# Patient Record
Sex: MALE | Race: WHITE | Employment: FULL TIME | URBAN - METROPOLITAN AREA
[De-identification: names, ages, dates, MRNs, and addresses within clinical notes are randomized per-mention and may not be internally consistent; named-entity substitution may affect disease eponyms.]

---

## 2018-06-04 ENCOUNTER — OFFICE VISIT (OUTPATIENT)
Dept: OPHTHALMOLOGY | Facility: CLINIC | Age: 38
End: 2018-06-04
Payer: COMMERCIAL

## 2018-06-04 DIAGNOSIS — H10.13 ALLERGIC CONJUNCTIVITIS OF BOTH EYES: Primary | ICD-10-CM

## 2018-06-04 RX ORDER — OLOPATADINE HYDROCHLORIDE 2 MG/ML
1 SOLUTION/ DROPS OPHTHALMIC DAILY
Qty: 1 BOTTLE | Refills: 3 | Status: SHIPPED | OUTPATIENT
Start: 2018-06-04 | End: 2019-06-27

## 2018-06-04 RX ORDER — CETIRIZINE HYDROCHLORIDE 10 MG/1
10 TABLET ORAL DAILY
COMMUNITY
End: 2019-07-26

## 2018-06-04 ASSESSMENT — EXTERNAL EXAM - RIGHT EYE: OD_EXAM: NORMAL

## 2018-06-04 ASSESSMENT — CONF VISUAL FIELD
OD_NORMAL: 1
METHOD: COUNTING FINGERS
OS_NORMAL: 1

## 2018-06-04 ASSESSMENT — CUP TO DISC RATIO
OS_RATIO: 0.3
OD_RATIO: 0.35

## 2018-06-04 ASSESSMENT — TONOMETRY
OD_IOP_MMHG: 18
OS_IOP_MMHG: 19
IOP_METHOD: ICARE

## 2018-06-04 ASSESSMENT — VISUAL ACUITY
OS_SC: 20/15
METHOD: SNELLEN - LINEAR
OD_SC: 20/20

## 2018-06-04 ASSESSMENT — SLIT LAMP EXAM - LIDS
COMMENTS: NORMAL
COMMENTS: NORMAL

## 2018-06-04 ASSESSMENT — EXTERNAL EXAM - LEFT EYE: OS_EXAM: NORMAL

## 2018-06-04 NOTE — PROGRESS NOTES
Assessment/Plan  (H10.13) Allergic conjunctivitis of both eyes  (primary encounter diagnosis)  Comment: Symptomatic, relief with ClearEyes  Plan: olopatadine HCl (PATADAY) 0.2 % SOLN         Educated patient on clinical findings. Prescribed Pataday qd OU, and recommended Systane Ultra prn. Advised against the use of ClearEyes and Visine, as these products contain astringents.     Return to clinic in 6 weeks for follow-up and dilated exam.    Complete documentation of historical and exam elements from today's encounter can  be found in the full encounter summary report (not reduplicated in this progress  note). I personally obtained the chief complaint(s) and history of present illness. I  confirmed and edited as necessary the review of systems, past medical/surgical  history, family history, social history, and examination findings as documented by  others; and I examined the patient myself. I personally reviewed the relevant tests,  images, and reports as documented above. I formulated and edited as necessary the  assessment and plan and discussed the findings and management plan with the  patient and family.    Kory Bautista, ANGEL, FAAO

## 2018-06-04 NOTE — MR AVS SNAPSHOT
After Visit Summary   6/4/2018    Delbert Craig    MRN: [id number]           Patient Information     Date Of Birth          1980        Visit Information        Provider Department      6/4/2018 8:30 AM Kory Bautista, OD [facility]        Today's Diagnoses     Allergic conjunctivitis of both eyes    -  1       Follow-ups after your visit        Follow-up notes from your care team     Return in about 6 weeks (around 7/16/2018) for Follow Up.      Your next 10 appointments already scheduled     Jul 16, 2018  9:00 AM CDT   Return Visit with Kory Bautista, ANGEL   [facility] ([facility])    [facility]  [address] [address] [phone]              Who to contact     Please call your clinic at [phone] to:    Ask questions about your health    Make or cancel appointments    Discuss your medicines    Learn about your test results    Speak to your doctor            Additional Information About Your Visit        MyChart Information     [employer] gives you secure access to your electronic health record. If you see a primary care provider, you can also send messages to your care team and make appointments. If you have questions, please call your primary care clinic.  If you do not have a primary care provider, please call [phone] and they will assist you.      [employer] is an electronic gateway that provides easy, online access to your medical records. With [employer], you can request a clinic appointment, read your test results, renew a prescription or communicate with your care team.     To access your existing account, please contact your [facility] or call [phone] for assistance.        Care EveryWhere ID     This is your Care EveryWhere ID. This could be used by other organizations to access your [place] medical records  [id number]          Blood Pressure from Last 3 Encounters:   03/30/16 110/60   02/09/16 121/76   08/14/15 116/74    Weight from Last 3 Encounters:   03/30/16 85 kg (187 lb 8 oz)   02/09/16 86.5 kg (190 lb 12.8 oz)   08/14/15 85.6 kg (188 lb 12.8 oz)              Today, you had the following     No orders found for display         Today's Medication Changes          These changes are accurate as of 6/4/18  9:05 AM.  If you have any questions, ask your nurse or doctor.               Start taking these medicines.        Dose/Directions    olopatadine HCl 0.2 % Soln   Commonly known as:  PATADAY   Used for:  Allergic conjunctivitis of both eyes   Started by:  Kory Bautista, OD        Dose:  1 drop   Place 1 drop into both eyes daily   Quantity:  1 Bottle   Refills:  3            Where to get your medicines      These medications were sent to [place] [address] [place] [address]     Phone:  [phone]     olopatadine HCl 0.2 % Soln                Primary Care Provider Office Phone # Fax #    Tex Vivar [phone] [address] [address]        Equal Access to Services     ANGELA MURPHY AH: Newton brancho Sobrunildaali, waaxda luqadaha, qaybta kaalmada adeegyada, josiah batista haygabriel esqueda. So [facility] [phone].    ATENCIÓN: Si habla español, tiene a landeros disposición servicios gratuitos de asistencia lingüística. Llame al [phone].    We comply with applicable federal civil rights laws and [place] laws. We do not discriminate on the basis of race, color, national origin, age, disability, sex, sexual orientation, or gender identity.            Thank you!     Thank you for choosing [facility]  for your care. Our goal is always to provide you with excellent care. Hearing back from our patients is one way we can continue to improve our services. Please take a few minutes to  complete the written survey that you may receive in the mail after your visit with us. Thank you!             Your Updated Medication List - Protect others around you: Learn how to safely use, store and throw away your medicines at www.disposemymeds.org.          This list is accurate as of 6/4/18  9:05 AM.  Always use your most recent med list.                   Brand Name Dispense Instructions for use Diagnosis    cetirizine 10 MG tablet    zyrTEC     Take 10 mg by mouth daily        naphazoline-pheniramine Soln    NAPHCON-A, VISINE-A     Place 1 drop into both eyes daily        olopatadine HCl 0.2 % Soln    PATADAY    1 Bottle    Place 1 drop into both eyes daily    Allergic conjunctivitis of both eyes       sertraline 50 MG tablet    ZOLOFT    90 tablet    Take 1 tablet (50 mg) by mouth daily    Dysthymia

## 2018-06-04 NOTE — NURSING NOTE
"Chief Complaints and History of Present Illnesses   Patient presents with     Red Eye Both Eyes     HPI    Affected eye(s):  Both   Symptoms:     No decreased vision   Redness   Itching      Duration:  6 months   Frequency:  Constant       Do you have eye pain now?:  No      Comments:  Pt states that he has always had red eye issues that never really went away, in spring he would also get really itchy He had been using Clear Eyes Complete Symptom relief with (with naphazoline) for \"years\" for redness    This year he tried a bunch of different allergy medications.  He is using Visine allergy drops this season (also have naphazoline) and it also helped with the itching.  He is wondering if there are any problems/long term effects of using this medication on a daily basis long term.    Pt has never worn glasses/contacts.  He has \"one good eye and one bad eye\" from childhood - he thinks one is 20/20 and one is 20/30.  They work together well and he is happy with level of vision.      Marcia Valadez, BALJINDER 8:38 AM 06/04/2018               "

## 2019-05-03 ENCOUNTER — OFFICE VISIT (OUTPATIENT)
Dept: FAMILY MEDICINE | Facility: CLINIC | Age: 39
End: 2019-05-03
Payer: COMMERCIAL

## 2019-05-03 VITALS
HEIGHT: 77 IN | TEMPERATURE: 98.1 F | SYSTOLIC BLOOD PRESSURE: 128 MMHG | OXYGEN SATURATION: 98 % | WEIGHT: 193 LBS | DIASTOLIC BLOOD PRESSURE: 67 MMHG | HEART RATE: 77 BPM | BODY MASS INDEX: 22.79 KG/M2

## 2019-05-03 DIAGNOSIS — B35.1 ONYCHOMYCOSIS: ICD-10-CM

## 2019-05-03 PROCEDURE — 99214 OFFICE O/P EST MOD 30 MIN: CPT | Performed by: FAMILY MEDICINE

## 2019-05-03 RX ORDER — TERBINAFINE HYDROCHLORIDE 250 MG/1
250 TABLET ORAL DAILY
Qty: 90 TABLET | Refills: 0 | Status: SHIPPED | OUTPATIENT
Start: 2019-05-03 | End: 2019-06-27

## 2019-05-03 ASSESSMENT — MIFFLIN-ST. JEOR: SCORE: 1912.82

## 2019-05-03 NOTE — NURSING NOTE
"Chief Complaint   Patient presents with     Toenail     right great toenail fungus     initial /67 (BP Location: Right arm, Cuff Size: Adult Regular)   Pulse 77   Temp 98.1  F (36.7  C) (Oral)   Ht 1.956 m (6' 5\")   Wt 87.5 kg (193 lb)   SpO2 98%   BMI 22.89 kg/m   Estimated body mass index is 22.89 kg/m  as calculated from the following:    Height as of this encounter: 1.956 m (6' 5\").    Weight as of this encounter: 87.5 kg (193 lb).  BP completed using cuff size: regular.  R arm      Health Maintenance that is potentially due pending provider review:  NONE    n/a    Americo Mendiola ma  "

## 2019-05-03 NOTE — PROGRESS NOTES
"  SUBJECTIVE:   Delbert Craig is a 38 year old male who presents to clinic today for the following   health issues:      Patient has toenail fungus on his great toe  He has had this problem before and believes he had a treatment with terbinafine which was effective    He is worried about the side effects of it but would like to consider doing it again as the problem is recurring    ROS: As per HPI.  Constitutional: no recent illness, no fevers/sweats/chills  No other rash    Allergies, reviewed:     Allergies   Allergen Reactions     No Known Drug Allergy       Med list prior to vist:    Current Outpatient Medications on File Prior to Visit:  cetirizine (ZYRTEC) 10 MG tablet Take 10 mg by mouth daily   naphazoline-pheniramine (NAPHCON-A, VISINE-A) SOLN Place 1 drop into both eyes daily   olopatadine HCl (PATADAY) 0.2 % SOLN Place 1 drop into both eyes daily (Patient not taking: Reported on 5/3/2019)   sertraline (ZOLOFT) 50 MG tablet Take 1 tablet (50 mg) by mouth daily (Patient not taking: Reported on 5/3/2019)     No current facility-administered medications on file prior to visit.   Medications reviewed and updated    Objective:  /67 (BP Location: Right arm, Cuff Size: Adult Regular)   Pulse 77   Temp 98.1  F (36.7  C) (Oral)   Ht 1.956 m (6' 5\")   Wt 87.5 kg (193 lb)   SpO2 98%   BMI 22.89 kg/m    General Appearance: Pleasant, alert, WN/WD in no acute respiratory distress.  Eye Exam: Normal external eye, conjunctiva, lids.  OroPharynx Exam: Dental hygiene adequate. Normal buccal mucosa. Normal pharynx.  Neck Exam: Supple, no masses or enlarged, tender nodes.  Cardiovascular Exam: No edema or vascular insufficiency.  Skin: No other rash mild great toe onychomycosis  Neurologic Exam: Nonfocal, no tremor. Normal gait.  Psychiatric Exam: Alert - appropriate, normal affect    ASSESSMENT/PLAN:    ICD-10-CM    1. Onychomycosis B35.1 terbinafine (LAMISIL) 250 MG tablet     Comprehensive metabolic panel "   We discussed the pluses and minuses of using medications such as these for treatment of this condition  We had a discussion about this diagnosis and went over the various aspects to consider. Any treatment is optional. Terbinafine (Lamisil) is effective,  may have liver toxicity, recommend alcohol moderation, and rechecking liver function tests if it's being taken for a prolonged period of time    . After discussion the patient has decided to accept an Rx for Lamisil.     Additional follow-up includes a recheck of liver function tests in 4 weeks    Michael Eubanks MD MPH

## 2019-06-27 ENCOUNTER — OFFICE VISIT (OUTPATIENT)
Dept: FAMILY MEDICINE | Facility: CLINIC | Age: 39
End: 2019-06-27
Payer: COMMERCIAL

## 2019-06-27 VITALS
TEMPERATURE: 98.1 F | SYSTOLIC BLOOD PRESSURE: 104 MMHG | BODY MASS INDEX: 22.62 KG/M2 | HEART RATE: 72 BPM | HEIGHT: 77 IN | WEIGHT: 191.6 LBS | OXYGEN SATURATION: 97 % | DIASTOLIC BLOOD PRESSURE: 66 MMHG

## 2019-06-27 DIAGNOSIS — N52.9 ERECTILE DYSFUNCTION, UNSPECIFIED ERECTILE DYSFUNCTION TYPE: ICD-10-CM

## 2019-06-27 DIAGNOSIS — B35.1 ONYCHOMYCOSIS: Primary | ICD-10-CM

## 2019-06-27 PROCEDURE — 80076 HEPATIC FUNCTION PANEL: CPT | Performed by: PHYSICIAN ASSISTANT

## 2019-06-27 PROCEDURE — 99214 OFFICE O/P EST MOD 30 MIN: CPT | Performed by: PHYSICIAN ASSISTANT

## 2019-06-27 PROCEDURE — 36415 COLL VENOUS BLD VENIPUNCTURE: CPT | Performed by: PHYSICIAN ASSISTANT

## 2019-06-27 RX ORDER — SILDENAFIL CITRATE 20 MG/1
TABLET ORAL
Qty: 30 TABLET | Refills: 3 | Status: SHIPPED | OUTPATIENT
Start: 2019-06-27 | End: 2019-10-07

## 2019-06-27 RX ORDER — TERBINAFINE HYDROCHLORIDE 250 MG/1
250 TABLET ORAL DAILY
Qty: 90 TABLET | Refills: 0 | Status: SHIPPED | OUTPATIENT
Start: 2019-06-27 | End: 2020-01-13

## 2019-06-27 ASSESSMENT — MIFFLIN-ST. JEOR: SCORE: 1906.47

## 2019-06-27 NOTE — NURSING NOTE
"Chief Complaint   Patient presents with     Recheck Medication     terbinafine     /66   Pulse 72   Temp 98.1  F (36.7  C) (Oral)   Ht 1.956 m (6' 5\")   Wt 86.9 kg (191 lb 9.6 oz)   SpO2 97%   BMI 22.72 kg/m   Estimated body mass index is 22.72 kg/m  as calculated from the following:    Height as of this encounter: 1.956 m (6' 5\").    Weight as of this encounter: 86.9 kg (191 lb 9.6 oz).        Health Maintenance due pending provider review:  NONE    n/a    Lissa Blackburn CMA  "

## 2019-06-27 NOTE — PROGRESS NOTES
Subjective     Delbert rCaig is a 38 year old male who presents to clinic today for the following health issues:    HPI   Chief Complaint   Patient presents with     Recheck Medication     terbinafine       Has been on terbinafine for about 2 months.  He does think that it is helping.  He does remember zora on in the past, and thinks that he may need longer than 3 months to fully go away.  Tolerating well.  Due for hepatic panel today.    He is also interested in medication for ED.  He does admit that this is something that he has struggled with on and off with for some time.  He has taken a viagra in the past, and has work well without side effects.  He has trouble maintaining erections.    BP Readings from Last 3 Encounters:   06/27/19 104/66   05/03/19 128/67   03/30/16 110/60    Wt Readings from Last 3 Encounters:   06/27/19 86.9 kg (191 lb 9.6 oz)   05/03/19 87.5 kg (193 lb)   03/30/16 85 kg (187 lb 8 oz)                      Reviewed and updated as needed this visit by Provider         Review of Systems   ROS COMP: Constitutional, HEENT, cardiovascular, pulmonary, gi and gu systems are negative, except as otherwise noted.      Objective    Wt 86.9 kg (191 lb 9.6 oz)   BMI 22.72 kg/m    Body mass index is 22.72 kg/m .  Physical Exam   GENERAL: alert and no distress  EYES: Eyes grossly normal to inspection  RESP: lungs clear to auscultation - no rales, rhonchi or wheezes  CV: regular rate and rhythm, normal S1 S2, no S3 or S4, no murmur, click or rub, no peripheral edema and peripheral pulses strong  PSYCH: mentation appears normal, affect normal/bright    Diagnostic Test Results:  Labs reviewed in Epic        Assessment & Plan     1. Onychomycosis  Refilled and will check hepatic panel.  Tolerating well.  - terbinafine (LAMISIL) 250 MG tablet; Take 1 tablet (250 mg) by mouth daily  Dispense: 90 tablet; Refill: 0  - Hepatic panel    2. Erectile dysfunction, unspecified erectile dysfunction type  Discussed  benefits and potential side effects along with other choices such as cialis.  He would like to try sildenafil.    - sildenafil (REVATIO) 20 MG tablet; 20-40 mg PO 30 minutes prior to sexual activity.  Dispense: 30 tablet; Refill: 3           No follow-ups on file.    Sebastien Tena PA-C  [facility]

## 2019-06-28 LAB
ALBUMIN SERPL-MCNC: 4.5 G/DL (ref 3.4–5)
ALP SERPL-CCNC: 62 U/L (ref 40–150)
ALT SERPL W P-5'-P-CCNC: 28 U/L (ref 0–70)
AST SERPL W P-5'-P-CCNC: 17 U/L (ref 0–45)
BILIRUB DIRECT SERPL-MCNC: 0.2 MG/DL (ref 0–0.2)
BILIRUB SERPL-MCNC: 0.4 MG/DL (ref 0.2–1.3)
PROT SERPL-MCNC: 7 G/DL (ref 6.8–8.8)

## 2019-07-01 NOTE — RESULT ENCOUNTER NOTE
Dear Delbert    Your test results are attached, feel free to contact me via [email or web address]     Everything looks just fine on your liver tests.    Mir Tena PA-C

## 2019-07-25 NOTE — TELEPHONE ENCOUNTER
RECORDS RECEIVED FROM: Concussion/Self/BCBS/Concussion con  Tennis injury   DATE RECEIVED: 7/25   NOTES STATUS DETAILS   OFFICE NOTE from referring provider N/A    OFFICE NOTE from other specialist N/A    DISCHARGE SUMMARY from hospital N/A    DISCHARGE REPORT from the ER N/A    OPERATIVE REPORT N/A    MEDICATION LIST Internal    IMPLANT RECORD/STICKER N/A    LABS     CBC/DIFF N/A    CULTURES N/A    INJECTIONS DONE IN RADIOLOGY N/A    MRI N/A    CT SCAN N/A    XRAYS (IMAGES & REPORTS) N/A    TUMOR     PATHOLOGY  Slides & report N/A

## 2019-07-26 ENCOUNTER — OFFICE VISIT (OUTPATIENT)
Dept: FAMILY MEDICINE | Facility: CLINIC | Age: 39
End: 2019-07-26
Payer: COMMERCIAL

## 2019-07-26 VITALS
RESPIRATION RATE: 17 BRPM | HEIGHT: 77 IN | BODY MASS INDEX: 22.79 KG/M2 | HEART RATE: 64 BPM | TEMPERATURE: 97.5 F | WEIGHT: 193 LBS | DIASTOLIC BLOOD PRESSURE: 63 MMHG | OXYGEN SATURATION: 99 % | SYSTOLIC BLOOD PRESSURE: 112 MMHG

## 2019-07-26 DIAGNOSIS — S06.0X0A CONCUSSION WITHOUT LOSS OF CONSCIOUSNESS, INITIAL ENCOUNTER: Primary | ICD-10-CM

## 2019-07-26 PROCEDURE — 99213 OFFICE O/P EST LOW 20 MIN: CPT | Performed by: FAMILY MEDICINE

## 2019-07-26 ASSESSMENT — MIFFLIN-ST. JEOR: SCORE: 1912.82

## 2019-07-26 NOTE — NURSING NOTE
"Chief Complaint   Patient presents with     Fall     /63   Pulse 64   Temp 97.5  F (36.4  C) (Oral)   Resp 17   Ht 1.956 m (6' 5\")   Wt 87.5 kg (193 lb)   SpO2 99%   BMI 22.89 kg/m   Estimated body mass index is 22.89 kg/m  as calculated from the following:    Height as of this encounter: 1.956 m (6' 5\").    Weight as of this encounter: 87.5 kg (193 lb).  bp completed using cuff size: regular       Health Maintenance addressed:  PHQ9    Possibly completing today per provider review.    Maribel Luciano MA     "

## 2019-07-26 NOTE — PROGRESS NOTES
Subjective     Delbert Craig is a 38 year old male who presents to clinic today for the following health issues:    HPI   Joint Pain    Onset: last Saturday pt states he fell backwards while playing tennis , there was a puddle on the court and he slipped and fell backwards but the most impact of the fall was on his right hip/side and shoulder and elbow , he is not sure if he hit his head, neck was tense the next day , he did not lose consciousness , was able to get up and go . Not much pain immediately and he was not seen that day but the next day felt off , like he could not focus as well, no headaches some low appetite , may be nausea, no vomiting , felt tired , memory not as good and not able to sleep at night - all of those prompted him to come for evaluation .    Description:   Location: head  Character: no pain     Intensity: moderate    Progression of Symptoms: intermittent    Accompanying Signs & Symptoms:  Other symptoms: nausea, headache, and lack of appetite     History:   Previous similar pain: no       Precipitating factors:   Trauma or overuse: YES- pt had a fall last week     Alleviating factors:  Improved by: nothing    Therapies Tried and outcome: Tylenol and Ibuprofen           Patient Active Problem List   Diagnosis     History of alcoholism (H)     CARDIOVASCULAR SCREENING; LDL GOAL LESS THAN 160     Asymptomatic varicose veins     Tinea versicolor     Dysthymia     History reviewed. No pertinent surgical history.    Social History     Tobacco Use     Smoking status: Former Smoker     Packs/day: 0.50     Types: Cigarettes     Smokeless tobacco: Never Used     Tobacco comment: quit a month ago   Substance Use Topics     Alcohol use: No     Alcohol/week: 0.0 oz     Family History   Problem Relation Age of Onset     Alzheimer Disease Maternal Grandfather      Family History Negative Mother      Diabetes No family hx of      Coronary Artery Disease No family hx of      Hypertension No family hx of       Hyperlipidemia No family hx of      Cerebrovascular Disease No family hx of      Breast Cancer No family hx of      Colon Cancer No family hx of      Prostate Cancer No family hx of      Other Cancer No family hx of      Depression No family hx of      Anxiety Disorder No family hx of      Mental Illness No family hx of      Substance Abuse No family hx of      Anesthesia Reaction No family hx of      Asthma No family hx of      Osteoporosis No family hx of      Genetic Disorder No family hx of      Thyroid Disease No family hx of      Obesity No family hx of      Unknown/Adopted No family hx of      Glaucoma No family hx of      Macular Degeneration No family hx of          Current Outpatient Medications   Medication Sig Dispense Refill     sildenafil (REVATIO) 20 MG tablet 20-40 mg PO 30 minutes prior to sexual activity. 30 tablet 3     terbinafine (LAMISIL) 250 MG tablet Take 1 tablet (250 mg) by mouth daily 90 tablet 0     Allergies   Allergen Reactions     No Known Drug Allergy      Recent Labs   Lab Test 06/27/19  1617 08/14/15  1340 07/28/14  0916   ALT 28 38 39   CR  --  0.96  --    GFRESTIMATED  --  89  --    GFRESTBLACK  --  >90   GFR Calc    --    POTASSIUM  --  3.9  --    TSH  --  1.29  --       BP Readings from Last 3 Encounters:   07/26/19 112/63   06/27/19 104/66   05/03/19 128/67    Wt Readings from Last 3 Encounters:   07/26/19 87.5 kg (193 lb)   06/27/19 86.9 kg (191 lb 9.6 oz)   05/03/19 87.5 kg (193 lb)                    Reviewed and updated as needed this visit by Provider         Review of Systems   ROS COMP: Constitutional, HEENT, cardiovascular, pulmonary, GI, , musculoskeletal, neuro, skin, endocrine and psych systems are negative, except as otherwise noted.      Objective    There were no vitals taken for this visit.  There is no height or weight on file to calculate BMI.  Physical Exam   GENERAL: healthy, alert and no distress  EYES: Eyes grossly normal to  inspection, PERRL and conjunctivae and sclerae normal  NECK: no adenopathy, no asymmetry, masses, or scars and thyroid normal to palpation  RESP: lungs clear to auscultation - no rales, rhonchi or wheezes  CV: regular rate and rhythm, normal S1 S2, no S3 or S4, no murmur, click or rub, no peripheral edema and peripheral pulses strong  MS: no gross musculoskeletal defects noted, no edema  NEURO: Normal strength and tone, mentation intact and speech normal    Diagnostic Test Results:  Labs reviewed in Epic  none         Assessment & Plan     1. Concussion without loss of consciousness, initial encounter  We discussed physical and mental rest , he does have an appointment with the concussion specialist this coming Monday . If he develops headache or vomiting , or any other new symtpoms , he would need to be seen right away , otherwise f/u next Monday . He does not need work note and already has some time off work for this week .  - CONCUSSION  REFERRAL       RTC if no improving or worsening.  Pt is aware  and comfortable with the current plan.      No follow-ups on file.    Cassidy Cramer MD  [facility]

## 2019-07-29 ENCOUNTER — PRE VISIT (OUTPATIENT)
Dept: ORTHOPEDICS | Facility: CLINIC | Age: 39
End: 2019-07-29

## 2019-08-10 NOTE — TELEPHONE ENCOUNTER
RECORDS RECEIVED FROM: Concussion/Cassidy Cramer MD/IMAN/Concussion con  Tennis injury   DATE RECEIVED: 8/10   NOTES STATUS DETAILS   OFFICE NOTE from referring provider N/A    OFFICE NOTE from other specialist Internal    DISCHARGE SUMMARY from hospital N/A    DISCHARGE REPORT from the ER N/A    OPERATIVE REPORT N/A    MEDICATION LIST Internal    IMPLANT RECORD/STICKER N/A    LABS     CBC/DIFF N/A    CULTURES N/A    INJECTIONS DONE IN RADIOLOGY N/A    MRI N/A    CT SCAN N/A    XRAYS (IMAGES & REPORTS) N/A    TUMOR     PATHOLOGY  Slides & report N/A

## 2019-08-19 ENCOUNTER — PRE VISIT (OUTPATIENT)
Dept: ORTHOPEDICS | Facility: CLINIC | Age: 39
End: 2019-08-19

## 2019-08-19 ENCOUNTER — OFFICE VISIT (OUTPATIENT)
Dept: ORTHOPEDICS | Facility: CLINIC | Age: 39
End: 2019-08-19
Payer: COMMERCIAL

## 2019-08-19 VITALS — BODY MASS INDEX: 22.43 KG/M2 | HEIGHT: 77 IN | RESPIRATION RATE: 16 BRPM | WEIGHT: 190 LBS

## 2019-08-19 DIAGNOSIS — F07.81 CONCUSSION SYNDROME: Primary | ICD-10-CM

## 2019-08-19 ASSESSMENT — MIFFLIN-ST. JEOR: SCORE: 1899.21

## 2019-08-19 NOTE — PROGRESS NOTES
SUBJECTIVE:  Delbert Craig is a 38 year old male who is seen in consultation at the request of Dr. Cramer for  evaluation of a possible concussion that occurred 1 month ago.   Mechanism of injury: slipped and fell while playing tennis outside  Immediate Symptoms:  No loss of consciousness, no headache, sleep disturbance, excessive sleepiness, loss of appetite, behavior changes, poor concentration, nausea, neck pain  Symptoms at this visit:  Patient Supplied Answers To Sports Med Concussion Questionnaire:   Sports Medicine Concussion 8/19/2019   When did your injury occur? 7/20/2019   Date questionaire completed? 8/19/2019   How did your injury occur? slipped and fell tennis   Immediate symptoms of concussion? No loss of consciousness, no headache, sleep disturbance, excessive sleepiness, loss of appetite, behavior changes, poor concentration, nausea, neck pain   Symptoms at this visit: headache? 1   Symptoms at this visit: nausea? 1   Symptoms at this visit: balance problems? 0   Symptoms at this visit: fatigue? 3   Symptoms at this visit: sensitivity to light? 0   Symptoms at this visit: sensitivity to noise? 0   Symptoms at this visit: irritability? 2   Symptoms at this visit: feeling mentally foggy? 1   Symptoms at this visit: difficulty concentrating? 4   Symptoms at this visit: sleep? Frequent napping, Sleeping more than usual           Past pertinent history: Migraines: no     Depression: Yes: zoloft in the past     Anxiety: Yes:      Learning disability: no     ADHD: no     Past History of concussions: No      Patient's past medical, surgical, social and family histories reviewed:  Depression      REVIEW OF SYSTEMS:  CONSTITUTIONAL:NEGATIVE for fever, chills, change in weight  INTEGUMENTARY/SKIN: NEGATIVE for worrisome rashes, moles or lesions  MUSCULOSKELETAL:See HPI above  NEURO: NEGATIVE for weakness, dizziness or paresthesias      There were no vitals taken for this visit.        EXAM:  GENERAL  APPEARANCE: healthy, alert and no distress   SKIN: no suspicious lesions or rashes  NEURO: Normal strength and tone, mentation intact and speech normal  PSYCH:  mentation appears normal and affect normal/bright    HEENT:    Tympanic Membranes:Pearly  External Ear Canal:Normal  Oropharynx:Atraumatic  Reflexes: Normal  NECK:  supple, non-tender, FULL ROM    Neurologic:  Cranial Nerves 2-12:  intact  WENDI:Yes  EOMI:Yes  Nystagmus: No  Coordination:  Finger to Nose: normal      Balance Testing: Rhomberg:normal        Forward Tandem: normal  Advanced Balance Testing:           Single leg Balance with simultaneous cognitive test :normal  Painful Eye movements: No  Convergence Testing: Normal (</= 6 cm)  Visual Field Testing: normal  Neuro vestibular testing: Head Still eyes move side to side: no nystagmus, no headache, no dizziness and no nausea  Head still eyes move up and down: no nystagmus, no headache, no dizziness and no nausea  Eyes fixed head moves side to side: no nystagmus, no headache, no dizziness and no nausea  Eyes fixed, head moves up and down: no nystagmus, no headache, no dizziness and no nausea             Strength:  Shoulder shrug (C5):5/5  Deltoid (C5): 5/5  Bicep (C6):5/5  Wrist Extension (C6): 5/5  Tricep (C7):5/5  Wrist Flexion (C7): 5/5  Finger Flexion (C8/T1):5/5      ASSESSMENT/PLAN  Concussion syndrome.  He made this appointment 2 weeks ago but was not able to get until now.  Many of his symptoms have improved in the last 2 weeks.  Residual symptoms include sense of fogginess during the day and some issues where he notes subtle concentration changes when he plays a game with his children.  He has no neck ache or upper back ache and no tenderness there on exam.  Full range of motion of the neck.  He is sleeping through the night.  Is a past history of depression and took Zoloft for time.  He does not feel that he is dealing with residual depression symptoms currently.  He is starting to exercise  and believes this helps.  The sauna at the health club helps.  I encouraged him to continue to do those things.  His exam is reassuring.  He will look for continued resolution of his symptoms over the next month.  We discussed that residual symptoms can be treated independently.  If he was having trouble with sleep issues trazodone could be considered.  If he was having trouble with depression he can reinstate his Zoloft.  He plans on continuing to monitor the symptoms and look for improvement in following up if this is not the case.    This note was created with the use of Dragon software and unintentional spelling or errors may have occurred.

## 2019-08-19 NOTE — LETTER
8/19/2019      RE: Delbert Craig  [address] [facility] [address]       SUBJECTIVE:  Delbert Craig is a 38 year old male who is seen in consultation at the request of Dr. Cramer for  evaluation of a possible concussion that occurred 1 month ago.   Mechanism of injury: slipped and fell while playing tennis outside  Immediate Symptoms:  No loss of consciousness, no headache, sleep disturbance, excessive sleepiness, loss of appetite, behavior changes, poor concentration, nausea, neck pain  Symptoms at this visit:  Patient Supplied Answers To Sports Med Concussion Questionnaire:   Sports Medicine Concussion 8/19/2019   When did your injury occur? 7/20/2019   Date questionaire completed? 8/19/2019   How did your injury occur? slipped and fell tennis   Immediate symptoms of concussion? No loss of consciousness, no headache, sleep disturbance, excessive sleepiness, loss of appetite, behavior changes, poor concentration, nausea, neck pain   Symptoms at this visit: headache? 1   Symptoms at this visit: nausea? 1   Symptoms at this visit: balance problems? 0   Symptoms at this visit: fatigue? 3   Symptoms at this visit: sensitivity to light? 0   Symptoms at this visit: sensitivity to noise? 0   Symptoms at this visit: irritability? 2   Symptoms at this visit: feeling mentally foggy? 1   Symptoms at this visit: difficulty concentrating? 4   Symptoms at this visit: sleep? Frequent napping, Sleeping more than usual           Past pertinent history: Migraines: no     Depression: Yes: zoloft in the past     Anxiety: Yes:      Learning disability: no     ADHD: no     Past History of concussions: No      Patient's past medical, surgical, social and family histories reviewed:  Depression      REVIEW OF SYSTEMS:  CONSTITUTIONAL:NEGATIVE for fever, chills, change in weight  INTEGUMENTARY/SKIN: NEGATIVE for worrisome rashes, moles or lesions  MUSCULOSKELETAL:See HPI above  NEURO: NEGATIVE for weakness,  dizziness or paresthesias      There were no vitals taken for this visit.        EXAM:  GENERAL APPEARANCE: healthy, alert and no distress   SKIN: no suspicious lesions or rashes  NEURO: Normal strength and tone, mentation intact and speech normal  PSYCH:  mentation appears normal and affect normal/bright    HEENT:    Tympanic Membranes:Pearly  External Ear Canal:Normal  Oropharynx:Atraumatic  Reflexes: Normal  NECK:  supple, non-tender, FULL ROM    Neurologic:  Cranial Nerves 2-12:  intact  WENDI:Yes  EOMI:Yes  Nystagmus: No  Coordination:  Finger to Nose: normal      Balance Testing: Rhomberg:normal        Forward Tandem: normal  Advanced Balance Testing:           Single leg Balance with simultaneous cognitive test :normal  Painful Eye movements: No  Convergence Testing: Normal (</= 6 cm)  Visual Field Testing: normal  Neuro vestibular testing: Head Still eyes move side to side: no nystagmus, no headache, no dizziness and no nausea  Head still eyes move up and down: no nystagmus, no headache, no dizziness and no nausea  Eyes fixed head moves side to side: no nystagmus, no headache, no dizziness and no nausea  Eyes fixed, head moves up and down: no nystagmus, no headache, no dizziness and no nausea             Strength:  Shoulder shrug (C5):5/5  Deltoid (C5): 5/5  Bicep (C6):5/5  Wrist Extension (C6): 5/5  Tricep (C7):5/5  Wrist Flexion (C7): 5/5  Finger Flexion (C8/T1):5/5      ASSESSMENT/PLAN  Concussion syndrome.  He made this appointment 2 weeks ago but was not able to get until now.  Many of his symptoms have improved in the last 2 weeks.  Residual symptoms include sense of fogginess during the day and some issues where he notes subtle concentration changes when he plays a game with his children.  He has no neck ache or upper back ache and no tenderness there on exam.  Full range of motion of the neck.  He is sleeping through the night.  Is a past history of depression and took Zoloft for time.  He does not  feel that he is dealing with residual depression symptoms currently.  He is starting to exercise and believes this helps.  The sauna at the health club helps.  I encouraged him to continue to do those things.  His exam is reassuring.  He will look for continued resolution of his symptoms over the next month.  We discussed that residual symptoms can be treated independently.  If he was having trouble with sleep issues trazodone could be considered.  If he was having trouble with depression he can reinstate his Zoloft.  He plans on continuing to monitor the symptoms and look for improvement in following up if this is not the case.    This note was created with the use of Dragon software and unintentional spelling or errors may have occurred.      Carroll Hansen MD

## 2019-10-03 ENCOUNTER — TELEPHONE (OUTPATIENT)
Dept: FAMILY MEDICINE | Facility: CLINIC | Age: 39
End: 2019-10-03

## 2019-10-03 DIAGNOSIS — N52.9 ERECTILE DYSFUNCTION, UNSPECIFIED ERECTILE DYSFUNCTION TYPE: ICD-10-CM

## 2019-10-03 RX ORDER — SILDENAFIL CITRATE 20 MG/1
TABLET ORAL
Qty: 30 TABLET | Refills: 3 | Status: CANCELLED | OUTPATIENT
Start: 2019-10-03

## 2019-10-03 NOTE — TELEPHONE ENCOUNTER
Reason for Call:  Medication or medication refill:    Do you use a [facility]?  Name of the pharmacy and phone number for the current request:  [id number] [place] [address] [facility]      Name of the medication requested: Cialis    Other request: he would like to try this instead of the viagra    Can we leave a detailed message on this number? YES    Phone number patient can be reached at: Home number on file [phone] (home)    Best Time: any    Call taken on 10/3/2019 at 11:26 AM by Adilene Galicia

## 2019-10-07 ENCOUNTER — TELEPHONE (OUTPATIENT)
Dept: FAMILY MEDICINE | Facility: CLINIC | Age: 39
End: 2019-10-07

## 2019-10-07 RX ORDER — TADALAFIL 10 MG/1
TABLET ORAL
Qty: 9 TABLET | Refills: 1 | Status: SHIPPED | OUTPATIENT
Start: 2019-10-07 | End: 2019-10-16

## 2019-10-07 NOTE — TELEPHONE ENCOUNTER
JS,  Please see below and auth if appropriate.  Thanks,  Michelle Pastrana RN          Requested Prescriptions   Pending Prescriptions Disp Refills     sildenafil (REVATIO) 20 MG tablet 30 tablet 3     Si-40 mg PO 30 minutes prior to sexual activity.       There is no refill protocol information for this order

## 2019-10-07 NOTE — TELEPHONE ENCOUNTER
Reason for Call:  Medication or medication refill:    Do you use a [facility]?  Name of the pharmacy and phone number for the current request:         [id number] - [place] [id number] [facility]    Name of the medication requested:   tadalafil (CIALIS) 10 MG tablet [[id number]] (Order [id number])    Other request: Pt would like this sent to [facility] pharmacy instead    Can we leave a detailed message on this number? YES    Phone number patient can be reached at: Cell number on file:    Telephone Information:   Mobile [phone]       Best Time: any      Call taken on 10/7/2019 at 11:04 AM by Bassam Melendez

## 2019-10-07 NOTE — TELEPHONE ENCOUNTER
Informed pt to have [facility] do a pharm/pharm transfer.  Pt agreeable.    Michelle Pastrana RN

## 2019-10-16 DIAGNOSIS — N52.9 ERECTILE DYSFUNCTION, UNSPECIFIED ERECTILE DYSFUNCTION TYPE: ICD-10-CM

## 2019-10-16 RX ORDER — TADALAFIL 10 MG/1
TABLET ORAL
Qty: 18 TABLET | Refills: 1 | Status: SHIPPED | OUTPATIENT
Start: 2019-10-16

## 2019-10-16 NOTE — TELEPHONE ENCOUNTER
Cialis sent to [facility] in [facility] 10/7/2019 #9 with 1 refill  Per below pt had his Rx transferred to [facility]  Wonder if refill lost when Rx transferred  Left message for patient to callback.  Does he want more than 9 pills at a time?  Kylee CORTEZ RN

## 2019-10-16 NOTE — TELEPHONE ENCOUNTER
Reason for Call:  Medication or medication refill:    Do you use a [facility]?  Name of the pharmacy and phone number for the current request:      [id number] - [place] [id number] [facility]      Name of the medication requested:   tadalafil (CIALIS) 10 MG tablet 9 tablet 1 10/7/2019  --   Sig: 10 mg PO q 36 hours prn   Sent to pharmacy as: Tadalafil 10 MG Oral Tablet (CIALIS)   Class: E-Prescribe   Order: [id number]   E-Prescribing Status: Receipt confirmed by pharmacy (10/7/2019  9:32 AM CDT)         Other request:   Pt had this Rx switched to [facility], however he said they just gave him 9 count and no refills?    Please consider fill.     Can we leave a detailed message on this number? YES    Phone number patient can be reached at: Home number on file [phone] (home)    Best Time: Any     Call taken on 10/16/2019 at 4:01 PM by Zabrina Roman

## 2019-10-16 NOTE — TELEPHONE ENCOUNTER
Spoke with pt   Refill was lost during transfer  Pt wants #18 also   Told him would send Rx for #18 but whether pharmacy fills that or not depends on how many pills insurance allows  Prescription approved per [facility] Refill Protocol.  Kylee CORTEZ RN

## 2019-11-05 ENCOUNTER — HEALTH MAINTENANCE LETTER (OUTPATIENT)
Age: 39
End: 2019-11-05

## 2019-11-13 DIAGNOSIS — B35.1 ONYCHOMYCOSIS: ICD-10-CM

## 2019-11-14 RX ORDER — TERBINAFINE HYDROCHLORIDE 250 MG/1
TABLET ORAL
Qty: 90 TABLET | Refills: 0 | OUTPATIENT
Start: 2019-11-14

## 2020-01-13 ENCOUNTER — OFFICE VISIT (OUTPATIENT)
Dept: FAMILY MEDICINE | Facility: CLINIC | Age: 40
End: 2020-01-13
Payer: COMMERCIAL

## 2020-01-13 VITALS
DIASTOLIC BLOOD PRESSURE: 75 MMHG | WEIGHT: 207 LBS | SYSTOLIC BLOOD PRESSURE: 123 MMHG | HEIGHT: 77 IN | OXYGEN SATURATION: 96 % | HEART RATE: 60 BPM | BODY MASS INDEX: 24.44 KG/M2 | TEMPERATURE: 98.2 F

## 2020-01-13 DIAGNOSIS — M25.562 ACUTE PAIN OF LEFT KNEE: ICD-10-CM

## 2020-01-13 DIAGNOSIS — B35.1 ONYCHOMYCOSIS: Primary | ICD-10-CM

## 2020-01-13 PROCEDURE — 99214 OFFICE O/P EST MOD 30 MIN: CPT | Performed by: FAMILY MEDICINE

## 2020-01-13 RX ORDER — TERBINAFINE HYDROCHLORIDE 250 MG/1
250 TABLET ORAL DAILY
Qty: 90 TABLET | Refills: 0 | Status: SHIPPED | OUTPATIENT
Start: 2020-01-13

## 2020-01-13 ASSESSMENT — PATIENT HEALTH QUESTIONNAIRE - PHQ9: SUM OF ALL RESPONSES TO PHQ QUESTIONS 1-9: 1

## 2020-01-13 ASSESSMENT — MIFFLIN-ST. JEOR: SCORE: 1971.33

## 2020-01-13 NOTE — PROGRESS NOTES
"  Toenail    Patient has toenail fungus on his great toe  He has had this problem before and believes he had a treatment with terbinafine which was effective    He is worried about the side effects of it but would like to consider doing it again as the problem is recurring    Also Left knee pain, ongoing, old sports injury, worse with activity    ROS: As per HPI.  Constitutional: no recent illness, no fevers/sweats/chills  No other rash    Allergies, reviewed:     Allergies   Allergen Reactions     No Known Drug Allergy       Med list prior to vist:  tadalafil (CIALIS) 10 MG tablet, 10 mg PO q 36 hours prn    No current facility-administered medications on file prior to visit.     Medications reviewed and updated    Objective:  /75 (BP Location: Right arm, Cuff Size: Adult Large)   Pulse 60   Temp 98.2  F (36.8  C) (Oral)   Ht 1.956 m (6' 5\")   Wt 93.9 kg (207 lb)   SpO2 96%   BMI 24.55 kg/m    General Appearance: Pleasant, alert, WN/WD in no acute respiratory distress.  Cardiovascular Exam: No edema or vascular insufficiency.  MS: normal ROM  Skin: No other rash mild great toe onychomycosis  Neurologic Exam: Nonfocal, no tremor. Normal gait.  Psychiatric Exam: Alert - appropriate, normal affect    ASSESSMENT/PLAN:    ICD-10-CM    1. Onychomycosis B35.1 terbinafine (LAMISIL) 250 MG tablet   2. Acute pain of left knee M25.562 [facility] REFERRAL     We discussed the pluses and minuses of using medications such as these for treatment of this condition    We had a discussion about this diagnosis and went over the various aspects to consider. Any treatment is optional. Terbinafine (Lamisil) is effective,  may have liver toxicity, recommend alcohol moderation, and rechecking liver function tests if it's being taken for a prolonged period of time    . After discussion the patient has decided to accept an Rx for Lamisil.    Also Left knee problem  Discussed eval, referral to [facility]     Michael Ch " MD Raimundo MPH

## 2020-01-13 NOTE — NURSING NOTE
"Chief Complaint   Patient presents with     Toenail     initial /75 (BP Location: Right arm, Cuff Size: Adult Large)   Pulse 60   Temp 98.2  F (36.8  C) (Oral)   Ht 1.956 m (6' 5\")   Wt 93.9 kg (207 lb)   SpO2 96%   BMI 24.55 kg/m   Estimated body mass index is 24.55 kg/m  as calculated from the following:    Height as of this encounter: 1.956 m (6' 5\").    Weight as of this encounter: 93.9 kg (207 lb).  BP completed using cuff size: regular.   R arm      Health Maintenance that is potentially due pending provider review:  phq 9 given, declined flu shot    n/a    Americo Mendiola ma  "

## 2020-02-12 NOTE — TELEPHONE ENCOUNTER
DIAGNOSIS: Acute pain of left knee , Referred by Dr. Eubanks. No images   APPOINTMENT DATE: 2/27   NOTES STATUS DETAILS   OFFICE NOTE from referring provider Internal Mcihael Eubanks MD   OFFICE NOTE from other specialist N/A    DISCHARGE SUMMARY from hospital N/A    DISCHARGE REPORT from the ER N/A    OPERATIVE REPORT N/A    MEDICATION LIST Internal    MRI N/A    CT SCAN N/A    XRAYS (IMAGES & REPORTS) N/A

## 2020-02-27 ENCOUNTER — ANCILLARY PROCEDURE (OUTPATIENT)
Dept: GENERAL RADIOLOGY | Facility: CLINIC | Age: 40
End: 2020-02-27
Attending: FAMILY MEDICINE
Payer: COMMERCIAL

## 2020-02-27 ENCOUNTER — ANCILLARY PROCEDURE (OUTPATIENT)
Dept: GENERAL RADIOLOGY | Facility: CLINIC | Age: 40
End: 2020-02-27
Payer: COMMERCIAL

## 2020-02-27 ENCOUNTER — PRE VISIT (OUTPATIENT)
Dept: ORTHOPEDICS | Facility: CLINIC | Age: 40
End: 2020-02-27

## 2020-02-27 ENCOUNTER — OFFICE VISIT (OUTPATIENT)
Dept: ORTHOPEDICS | Facility: CLINIC | Age: 40
End: 2020-02-27
Attending: FAMILY MEDICINE
Payer: COMMERCIAL

## 2020-02-27 VITALS — BODY MASS INDEX: 24.44 KG/M2 | HEIGHT: 77 IN | WEIGHT: 207 LBS

## 2020-02-27 DIAGNOSIS — M79.671 RIGHT FOOT PAIN: Primary | ICD-10-CM

## 2020-02-27 DIAGNOSIS — M25.562 LEFT KNEE PAIN, UNSPECIFIED CHRONICITY: ICD-10-CM

## 2020-02-27 DIAGNOSIS — M25.562 LEFT KNEE PAIN, UNSPECIFIED CHRONICITY: Primary | ICD-10-CM

## 2020-02-27 DIAGNOSIS — M19.079 1ST MTP ARTHRITIS: ICD-10-CM

## 2020-02-27 DIAGNOSIS — S90.221A SUBUNGUAL HEMATOMA OF RIGHT FOOT, INITIAL ENCOUNTER: ICD-10-CM

## 2020-02-27 DIAGNOSIS — M25.562 PATELLOFEMORAL ARTHRALGIA OF LEFT KNEE: ICD-10-CM

## 2020-02-27 DIAGNOSIS — M79.671 RIGHT FOOT PAIN: ICD-10-CM

## 2020-02-27 ASSESSMENT — PAIN SCALES - GENERAL: PAINLEVEL: NO PAIN (0)

## 2020-02-27 ASSESSMENT — MIFFLIN-ST. JEOR: SCORE: 1971.33

## 2020-02-27 NOTE — PROGRESS NOTES
"Sports Medicine Clinic Visit    PCP: Michael Eubanks    Delbert Craig is a 39 year old male who is seen  in consultation at the request of Dr. Eubanks presenting with left knee and right big toe pain.    Injury: None    Location of Pain: right big toe and left anterior knee  Duration of Pain: 1 year big toe  Pain is better with: Rest  Pain is worse with: Pressure on big toe. Squatting, flexion, lifting, kneeling for knee.  Additional Features: Limited motion in big toe. Plays basketball, runs, and lifts weights.   Treatment so far consists of: Rest  Prior History of related problems: Left knee issue 20 years ago with cross country running    Ht 1.956 m (6' 5\")   Wt 93.9 kg (207 lb)   BMI 24.55 kg/m         PMH:  Active problem list:  Patient Active Problem List   Diagnosis     History of alcoholism (H)     CARDIOVASCULAR SCREENING; LDL GOAL LESS THAN 160     Asymptomatic varicose veins     Tinea versicolor     Dysthymia       FH:  Family History   Problem Relation Age of Onset     Alzheimer Disease Maternal Grandfather      Family History Negative Mother      Diabetes No family hx of      Coronary Artery Disease No family hx of      Hypertension No family hx of      Hyperlipidemia No family hx of      Cerebrovascular Disease No family hx of      Breast Cancer No family hx of      Colon Cancer No family hx of      Prostate Cancer No family hx of      Other Cancer No family hx of      Depression No family hx of      Anxiety Disorder No family hx of      Mental Illness No family hx of      Substance Abuse No family hx of      Anesthesia Reaction No family hx of      Asthma No family hx of      Osteoporosis No family hx of      Genetic Disorder No family hx of      Thyroid Disease No family hx of      Obesity No family hx of      Unknown/Adopted No family hx of      Glaucoma No family hx of      Macular Degeneration No family hx of        SH:  Social History     Socioeconomic History     Marital status: " Single     Spouse name: Not on file     Number of children: Not on file     Years of education: Not on file     Highest education level: Not on file   Occupational History     Not on file   Social Needs     Financial resource strain: Not on file     Food insecurity:     Worry: Not on file     Inability: Not on file     Transportation needs:     Medical: Not on file     Non-medical: Not on file   Tobacco Use     Smoking status: Former Smoker     Packs/day: 0.50     Types: Cigarettes     Smokeless tobacco: Never Used     Tobacco comment: quit a month ago   Substance and Sexual Activity     Alcohol use: No     Alcohol/week: 0.0 standard drinks     Drug use: No     Sexual activity: Yes   Lifestyle     Physical activity:     Days per week: Not on file     Minutes per session: Not on file     Stress: Not on file   Relationships     Social connections:     Talks on phone: Not on file     Gets together: Not on file     Attends Druze service: Not on file     Active member of club or organization: Not on file     Attends meetings of clubs or organizations: Not on file     Relationship status: Not on file     Intimate partner violence:     Fear of current or ex partner: Not on file     Emotionally abused: Not on file     Physically abused: Not on file     Forced sexual activity: Not on file   Other Topics Concern     Parent/sibling w/ CABG, MI or angioplasty before 65F 55M? No   Social History Narrative     Not on file       MEDS:  See EMR, reviewed  ALL:  See EMR, reviewed    REVIEW OF SYSTEMS:  CONSTITUTIONAL:NEGATIVE for fever, chills, change in weight  INTEGUMENTARY/SKIN: NEGATIVE for worrisome rashes, moles or lesions  EYES: NEGATIVE for vision changes or irritation  ENT/MOUTH: NEGATIVE for ear, mouth and throat problems  RESP:NEGATIVE for significant cough or SOB  BREAST: NEGATIVE for masses, tenderness or discharge  CV: NEGATIVE for chest pain, palpitations or peripheral edema  GI: NEGATIVE for nausea, abdominal  pain, heartburn, or change in bowel habits  :NEGATIVE for frequency, dysuria, or hematuria  :NEGATIVE for frequency, dysuria, or hematuria  NEURO: NEGATIVE for weakness, dizziness or paresthesias  ENDOCRINE: NEGATIVE for temperature intolerance, skin/hair changes  HEME/ALLERGY/IMMUNE: NEGATIVE for bleeding problems  PSYCHIATRIC: NEGATIVE for changes in mood or affect    Subjective: 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  #1 he has had a tendency to notice anterior left-sided knee discomfort with squats in the weight room and occasionally with running.  Can bother him going up and down stairs.  It does not lock or catch or swell.  He denies a past history of knee injury.  He had similar complaints with cross-country running in high school.  He has an x-ray today of his left knee that is normal.  #2 for the last year he is noted decreased range of motion at the great toe on the right.  There is been no specific injury.  He favors it when he walks.  He notes it with winter boots.  In the last month with basketball he developed a subungual hematoma under the right great toenail.  It was not present a month ago when he saw his primary doctor for an evaluation.  It is improved since that time.  For exercise he enjoys rockclimbing running basketball weightlifting and yoga    Objective he has a subungual hematoma of the right great toenail that seems to be resolving.  It is in various stages of discoloration.  He confirms that it was not there a month ago when he was examined by his primary doctor.  He has limited range of motion at the MTP joint.  He has no discomfort at the extremes of dorsiflexion or volar flexion.  There is no effusion at the MTP joints of the second through fifth digits and no synovial thickening.  No skin discoloration.  He is a neutral heel with a neutral forefoot.  The left knee reveals no effusion.  I can flex and extend it fully.  He is nontender over the medial lateral  patellar facets.  Nontender over the medial lateral joint lines.  Anterior posterior drawer is negative.  Lockman's is negative.  Normal range of motion at the hip.  He can do a full squat with good 2 legged squat form but is improvements that can be made in 1 legged squat form.  Appropriate in conversation and affect.    Assessment right great toe MTP DJD.  Subungual hematoma.  Left knee patellofemoral discomfort    Plan: We went over his great toe x-rays that show a moderate amount of MTP DJD with relative loss of joint space, spurring.  He would like to try rigid or semirigid orthotic to see if it helps with discomfort.  We discussed other options for pain control including an accommodative shoe for running and basketball with a wide enough toe box that it does not cause subungual hematoma.  I gave him online sources for a toe Gel sleeve.  We talked about potential options for pain control if necessary such as over-the-counter pain medicines and their side effects, cortisone injection, indications for surgery.  Physical therapy offered for proper squat form and patellofemoral training, declined for now.  He had no further questions and will follow-up as needed.    This note was created with the use of Dragon software and unintentional spelling or errors may have occurred.

## 2020-02-27 NOTE — LETTER
"  2/27/2020      RE: Delbert Craig  [address] [facility] [address]       Sports Medicine Clinic Visit    PCP: Michael Eubanks    Delbert Craig is a 39 year old male who is seen  in consultation at the request of Dr. Eubanks presenting with left knee and right big toe pain.    Injury: None    Location of Pain: right big toe and left anterior knee  Duration of Pain: 1 year big toe  Pain is better with: Rest  Pain is worse with: Pressure on big toe. Squatting, flexion, lifting, kneeling for knee.  Additional Features: Limited motion in big toe. Plays basketball, runs, and lifts weights.   Treatment so far consists of: Rest  Prior History of related problems: Left knee issue 20 years ago with cross country running    Ht 1.956 m (6' 5\")   Wt 93.9 kg (207 lb)   BMI 24.55 kg/m          PMH:  Active problem list:  Patient Active Problem List   Diagnosis     History of alcoholism (H)     CARDIOVASCULAR SCREENING; LDL GOAL LESS THAN 160     Asymptomatic varicose veins     Tinea versicolor     Dysthymia       FH:  Family History   Problem Relation Age of Onset     Alzheimer Disease Maternal Grandfather      Family History Negative Mother      Diabetes No family hx of      Coronary Artery Disease No family hx of      Hypertension No family hx of      Hyperlipidemia No family hx of      Cerebrovascular Disease No family hx of      Breast Cancer No family hx of      Colon Cancer No family hx of      Prostate Cancer No family hx of      Other Cancer No family hx of      Depression No family hx of      Anxiety Disorder No family hx of      Mental Illness No family hx of      Substance Abuse No family hx of      Anesthesia Reaction No family hx of      Asthma No family hx of      Osteoporosis No family hx of      Genetic Disorder No family hx of      Thyroid Disease No family hx of      Obesity No family hx of      Unknown/Adopted No family hx of      Glaucoma No family hx of      Macular " Degeneration No family hx of        SH:  Social History     Socioeconomic History     Marital status: Single     Spouse name: Not on file     Number of children: Not on file     Years of education: Not on file     Highest education level: Not on file   Occupational History     Not on file   Social Needs     Financial resource strain: Not on file     Food insecurity:     Worry: Not on file     Inability: Not on file     Transportation needs:     Medical: Not on file     Non-medical: Not on file   Tobacco Use     Smoking status: Former Smoker     Packs/day: 0.50     Types: Cigarettes     Smokeless tobacco: Never Used     Tobacco comment: quit a month ago   Substance and Sexual Activity     Alcohol use: No     Alcohol/week: 0.0 standard drinks     Drug use: No     Sexual activity: Yes   Lifestyle     Physical activity:     Days per week: Not on file     Minutes per session: Not on file     Stress: Not on file   Relationships     Social connections:     Talks on phone: Not on file     Gets together: Not on file     Attends Sabianist service: Not on file     Active member of club or organization: Not on file     Attends meetings of clubs or organizations: Not on file     Relationship status: Not on file     Intimate partner violence:     Fear of current or ex partner: Not on file     Emotionally abused: Not on file     Physically abused: Not on file     Forced sexual activity: Not on file   Other Topics Concern     Parent/sibling w/ CABG, MI or angioplasty before 65F 55M? No   Social History Narrative     Not on file       MEDS:  See EMR, reviewed  ALL:  See EMR, reviewed    REVIEW OF SYSTEMS:  CONSTITUTIONAL:NEGATIVE for fever, chills, change in weight  INTEGUMENTARY/SKIN: NEGATIVE for worrisome rashes, moles or lesions  EYES: NEGATIVE for vision changes or irritation  ENT/MOUTH: NEGATIVE for ear, mouth and throat problems  RESP:NEGATIVE for significant cough or SOB  BREAST: NEGATIVE for masses, tenderness or  discharge  CV: NEGATIVE for chest pain, palpitations or peripheral edema  GI: NEGATIVE for nausea, abdominal pain, heartburn, or change in bowel habits  :NEGATIVE for frequency, dysuria, or hematuria  :NEGATIVE for frequency, dysuria, or hematuria  NEURO: NEGATIVE for weakness, dizziness or paresthesias  ENDOCRINE: NEGATIVE for temperature intolerance, skin/hair changes  HEME/ALLERGY/IMMUNE: NEGATIVE for bleeding problems  PSYCHIATRIC: NEGATIVE for changes in mood or affect    Subjective: 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  #1 he has had a tendency to notice anterior left-sided knee discomfort with squats in the weight room and occasionally with running.  Can bother him going up and down stairs.  It does not lock or catch or swell.  He denies a past history of knee injury.  He had similar complaints with cross-country running in high school.  He has an x-ray today of his left knee that is normal.  #2 for the last year he is noted decreased range of motion at the great toe on the right.  There is been no specific injury.  He favors it when he walks.  He notes it with winter boots.  In the last month with basketball he developed a subungual hematoma under the right great toenail.  It was not present a month ago when he saw his primary doctor for an evaluation.  It is improved since that time.  For exercise he enjoys rockclimbing running basketball weightlifting and yoga    Objective he has a subungual hematoma of the right great toenail that seems to be resolving.  It is in various stages of discoloration.  He confirms that it was not there a month ago when he was examined by his primary doctor.  He has limited range of motion at the MTP joint.  He has no discomfort at the extremes of dorsiflexion or volar flexion.  There is no effusion at the MTP joints of the second through fifth digits and no synovial thickening.  No skin discoloration.  He is a neutral heel with a neutral forefoot.   The left knee reveals no effusion.  I can flex and extend it fully.  He is nontender over the medial lateral patellar facets.  Nontender over the medial lateral joint lines.  Anterior posterior drawer is negative.  Lockman's is negative.  Normal range of motion at the hip.  He can do a full squat with good 2 legged squat form but is improvements that can be made in 1 legged squat form.  Appropriate in conversation and affect.    Assessment right great toe MTP DJD.  Subungual hematoma.  Left knee patellofemoral discomfort    Plan: We went over his great toe x-rays that show a moderate amount of MTP DJD with relative loss of joint space, spurring.  He would like to try rigid or semirigid orthotic to see if it helps with discomfort.  We discussed other options for pain control including an accommodative shoe for running and basketball with a wide enough toe box that it does not cause subungual hematoma.  I gave him online sources for a toe Gel sleeve.  We talked about potential options for pain control if necessary such as over-the-counter pain medicines and their side effects, cortisone injection, indications for surgery.  Physical therapy offered for proper squat form and patellofemoral training, declined for now.  He had no further questions and will follow-up as needed.    This note was created with the use of Dragon software and unintentional spelling or errors may have occurred.      Carroll Hansen MD

## 2020-02-27 NOTE — Clinical Note
Thank you for allowing me to see your patient in Sports Medicine Clinic.  Please see the attached copy of our visit.Sincerely,Carroll Hansen MD

## 2020-03-11 ENCOUNTER — TELEPHONE (OUTPATIENT)
Dept: FAMILY MEDICINE | Facility: CLINIC | Age: 40
End: 2020-03-11

## 2020-03-11 ENCOUNTER — VIRTUAL VISIT (OUTPATIENT)
Dept: FAMILY MEDICINE | Facility: OTHER | Age: 40
End: 2020-03-11

## 2020-03-11 NOTE — PROGRESS NOTES
"Date: 2020 08:39:41  Clinician: Wiliam Dorsey  Clinician NPI: [id number]  Patient: DAVE GASTON  Patient : 1980  Patient Address: [address] [place] [address]  Patient Phone: [phone]  Visit Protocol: URI  Patient Summary:  DAVE is a 39 year old ( : 1980 ) male who initiated a Visit for COVID-19 (Coronavirus) evaluation and screening. When asked the question \"Please sign me up to receive news, health information and promotions. \", DAVE responded \"No\".    DAVE states his symptoms started suddenly 7-9 days ago. After his symptoms started, they improved and then got worse again.   His symptoms consist of rhinitis, malaise, a cough, and nasal congestion. He is experiencing mild difficulty breathing with activities but can speak normally in full sentences.   Symptom details     Nasal secretions: The color of his mucus is clear.    Cough: DAVE coughs a few times an hour and his cough is more bothersome at night. Phlegm does not come into his throat when he coughs. He does not believe his cough is caused by post-nasal drip.      DAVE denies having wheezing, ear pain, sore throat, fever, headache, teeth pain, enlarged lymph nodes, chills, facial pain or pressure, and myalgias. He also denies taking antibiotic medication for the symptoms and having recent facial or sinus surgery in the past 60 days.   Precipitating events  He has recently been exposed to someone with influenza. DAVE has been in close contact with the following high risk individuals: adults 65 or older.   Pertinent COVID-19 (Coronavirus) information  DAVE has not traveled internationally in the last 14 days before the start of his symptoms.   DAVE has not had close contact with a laboratory confirmed positive COVID-19 patient within 14 days of symptom onset.   Pertinent medical history  DAVE does not need a return to work/school note.   Weight: 200 lbs   DAVE does not " smoke or use smokeless tobacco.   Weight: 200 lbs    MEDICATIONS: terbinafine HCl oral, ALLERGIES: NKDA  Clinician Response:  Dear DAVE,  Based on the information provided, you have a viral upper respiratory infection, otherwise known as a cold. Symptoms vary from person to person, but can include sneezing, coughing, a runny nose, sore throat, and headache and range from mild to severe.  Unfortunately, there are no medications that can cure a cold, so treatment is focused on controlling symptoms as much as possible. Most people gradually feel better until symptoms are gone in 1-2 weeks.  Medication information  Because you have a viral infection, antibiotics will not help you get better. Treating a viral infection with antibiotics could actually make you feel worse.  I am prescribing:     Fluticasone 50 mcg/actuation nasal spray. Inhale 2 sprays in each nostril 1 time per day; after 1 week, may adjust to 1 - 2 sprays in each nostril 1 time per day. This medication takes several days to start working, so keep taking it even if it doesn't help right away. There are no refills with this prescription.   Unless you are allergic to the over-the-counter medication(s) below, I recommend using:       Acetaminophen (Tylenol or store brand) oral tablet. Take 1-2 tablets by mouth every 4-6 hours to help with the discomfort.      Ibuprofen (Advil or store brand) 200 mg oral tablet. Take 1-3 tablets (200-600 mg) by mouth every 8 hours to help with the discomfort. Make sure to take the ibuprofen with food. Do not exceed 2400 mg in 24 hours.    A decongestant such as Sudafed PE or store brand.    A sinus irrigation kit such as Sinus Rinse, Neti Pot, SinuCleanse, or store brand. Be sure to use sterile or previously boiled water to prevent unwanted infections.     Over-the-counter medications do not require a prescription. Ask the pharmacist if you have any questions.  Self care  The following tips will keep you as comfortable as  possible while you recover:     Rest    Drink plenty of water and other liquids    Take a hot shower to loosen congestion    Take a spoonful of honey to reduce your cough     When to seek care  Please be seen in a clinic or urgent care if new symptoms develop, or symptoms become worse.   Diagnosis: Viral URI  Diagnosis ICD: J06.9  Prescription: fluticasone 50 mcg/actuation nasal spray,suspension 1 120 spray aerosol with adapter (grams), 30 days supply. Inhale 2 sprays in each nostril 1 time per day; after 1 week, may adjust to 1 - 2 sprays in each nostril 1 time per day.. Refills: 0, Refill as needed: no, Allow substitutions: yes

## 2020-03-11 NOTE — TELEPHONE ENCOUNTER
Huddled with supervisor.  Since no international/domestic travel, contact/exposure to any one with symptoms/Covid-19.    Also no fever.    Can offer OV with PCP to check for regular flu.  Have 3:30 Friday 3/13 on hold with JF for patient.  Josette León RN

## 2020-03-11 NOTE — TELEPHONE ENCOUNTER
Reason for call:  Patient reporting a symptom    Symptom or request: URI, congestion     Duration (how long have symptoms been present): since last Tuesday     Have you been treated for this before? No    Additional comments: Pt wants to get tested for Cornavirus, pt has not been exposed or traveled outside the [place]     Phone Number patient can be reached at:  Home number on file [phone] (home)    Best Time:  Anytime     Can we leave a detailed message on this number:  YES    Call taken on 3/11/2020 at 7:48 AM by Kareen Barrios

## 2020-03-11 NOTE — TELEPHONE ENCOUNTER
"Spoke with patient.  States he has had a cough since Tuesday 3/3.  Denies fever, any other symptoms.  No recent international or domestic travel.  No exposure, contact to anyone who has traveled, Dx with Covid-19    Advised patient to go to www.[email or web address]  \"I just want to come into [place] for testing\"  Informed patient [facility] protocol is to have patient's do [facility].    Patient verbalizes understanding  Josette León RN    "

## 2020-03-11 NOTE — TELEPHONE ENCOUNTER
"Pt called back and states that he did use [facility] and followed the Covid- 19 screening exam. It determined that he had a cold and did not direct him to be tested. He reports that he disagrees with this and that he \" knows that I has the coronavirus\". He is still wanting to be tested and would like a call back to talk about this more.    "

## 2020-03-13 NOTE — TELEPHONE ENCOUNTER
No return call from patient.  OV with JF for today no longer on hold for patient.  Josette León RN

## 2020-04-23 ENCOUNTER — TELEPHONE (OUTPATIENT)
Dept: ORTHOPEDICS | Facility: CLINIC | Age: 40
End: 2020-04-23

## 2020-04-23 NOTE — TELEPHONE ENCOUNTER
Marianela Gisueppe schedule a video appt with Dr. Poe for his hand injury (unspecified laterality) sustained during FOOSH while playing basketball on 3/1/20.  Pain is mostly located on dorsal aspect of carpal bones, especially noticeable with wrist extension.  Informed of [facility] [employer] mariangel, verified email.     - Ilia SCHUMACHER ATC

## 2020-04-24 ENCOUNTER — VIRTUAL VISIT (OUTPATIENT)
Dept: ORTHOPEDICS | Facility: CLINIC | Age: 40
End: 2020-04-24
Payer: COMMERCIAL

## 2020-04-24 DIAGNOSIS — M25.532 LEFT WRIST PAIN: Primary | ICD-10-CM

## 2020-04-24 NOTE — PROGRESS NOTES
"Delbert Craig is a 39 year old male who is being evaluated via a billable video visit.      The patient has been notified of following:     \"This video visit will be conducted via a call between you and your physician/provider. We have found that certain health care needs can be provided without the need for an in-person physical exam.  This service lets us provide the care you need with a video conversation.  If a prescription is necessary we can send it directly to your pharmacy.  If lab work is needed we can place an order for that and you can then stop by our lab to have the test done at a later time.    Video visits are billed at different rates depending on your insurance coverage.  Please reach out to your insurance provider with any questions.    If during the course of the call the physician/provider feels a video visit is not appropriate, you will not be charged for this service.\"    Patient has given verbal consent for Video visit? Yes    How would you like to obtain your AVS? Nita    Patient would like the video invitation sent by: Send to e-mail at: [email or web address]    Will anyone else be joining your video visit? No     S: Delbert Craig is a RHD gentleman who is here for evaluation of left wrist pain.  He initially injured his wrist on February 28 while playing basketball.  He was going up for a lay up and had his legs taken out from under him.  He had a FOOSH injury of his left wrist.  He had initial pain over the dorsal aspect of his wrist and possibly some mild swelling.  He denies any bruising.  He bought a wrist brace on [employer] approximately a week later with that limited wrist extension.  His wrist pain has improved some but he continues to have significant pain with weightbearing as well as with wrist extension.  He feels as though he is limited in wrist extension, and he feels tightness over the dorsal aspect of his wrist with wrist flexion.  He denies any numbness or tingling " into his hand initially.  He has no other injuries from his fall.  No problems with finger movements.  He has since not been able to do any push-ups, and is doing most weightbearing exercises on his wrist with a neutral wrist and a closed fist.  He also feels weakness and pain with radial and ulnar deviation (such as opening cans and turning the steering wheel.  He has had no previous injuries to his wrist in the past.  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  He has seen no significant limitations with his work injury.  He has been icing occasionally and has been using ibuprofen as needed.    O: GEN: Pleasant and cooperative, no acute distress.  LEFT WRIST: No obvious swelling or bruising.  Limited wrist extension by approximately 5 degrees when compared to right.  Full wrist flexion.  Discomfort with radial and ulnar deviation.  No significant TTP over the snuffbox.  Pain localized directly over the scaphoid lunate joint and ligament over the dorsal aspect of his wrist.  No significant pain over the distal radius or ulna.  No pain with TFCC grind.  Pain with resisted little finger extension, but not with any other digit.  SKIN: No bruising, rash or other lesions.  PSYCH: Mood and affect appropriate    A/P:  Delbert Craig is here for evaluation of approximately 2 months of dorsal sided wrist pain specifically with weightbearing and wrist extension.  Initially had a FOOSH injury on February 28 falling from a decent height as he was going up for a lay up.  Has had some improvement, but continues to be restricted and wants to be sure that there is nothing else going on.  Based on initial exam he is tender over his scapholunate ligament.  With some of his restrictions in motion as well as weightbearing we do feel it would be appropriate to have him come in for x-rays, and possible ultrasound evaluation of the scapholunate ligament.  This would also be beneficial to have an  in person evaluation exam of his left wrist.    -We will follow-up in acute injury clinic on Monday 4/27 at 1:10 PM  -We will obtain x-rays of his left wrist -- 3 views as well as a scaphoid AND clenched fist view   -Discussed that he may need advanced imaging to further evaluate if x-rays and ultrasound evaluation are inconclusive    Video-Visit Details    Type of service:  Video Visit    Video Start Time: 8:50 AM  Video End Time: 9:26 AM    Originating Location (pt. Location): Home    Distant Location (provider location):  [facility] SPORTS MEDICINE     Mode of Communication:  Video Conference via [email or web address]    Dr. Poe was also present during this video visit.    Andreina Vides DO  Primary Care Sports Medicine Fellow

## 2020-04-24 NOTE — PROGRESS NOTES
Attending Note:   I have talked with this patient along with Dr. Vides via video visit and have reviewed the clinical presentation and watched the video examination with the fellow. I agree with the treatment plan as outlined. The plan was formulated with the fellow on the day of the patient's visit.   Sonam Poe MD, CAQ, CCD  [facility]  Sports Medicine and Bone Health

## 2020-04-27 ENCOUNTER — ANCILLARY PROCEDURE (OUTPATIENT)
Dept: GENERAL RADIOLOGY | Facility: CLINIC | Age: 40
End: 2020-04-27
Attending: FAMILY MEDICINE
Payer: COMMERCIAL

## 2020-04-27 ENCOUNTER — OFFICE VISIT (OUTPATIENT)
Dept: ORTHOPEDICS | Facility: CLINIC | Age: 40
End: 2020-04-27
Payer: COMMERCIAL

## 2020-04-27 DIAGNOSIS — M25.532 LEFT WRIST PAIN: ICD-10-CM

## 2020-04-27 DIAGNOSIS — S62.112A CLOSED DISPLACED FRACTURE OF TRIQUETRUM OF LEFT WRIST, INITIAL ENCOUNTER: Primary | ICD-10-CM

## 2020-04-27 NOTE — PROGRESS NOTES
Attending Note:   I have personally examined this patient and have reviewed the clinical presentation and progress note with the fellow. I agree with the treatment plan as outlined. The plan was formulated with the fellow on the day of the patient's visit. I have reviewed all imaging with the fellow and agree with the findings in the documentation.     Sonam Poe MD, CAQ, CCD  [facility]  Sports Medicine and Bone Health

## 2020-04-27 NOTE — LETTER
4/27/2020       RE: Delbert Craig  [address] [facility] [address]     Dear Colleague,    Thank you for referring your patient, Delbert Craig, to the [facility] SPORTS AND ORTHOPAEDIC WALK IN CLINIC at [facility]. Please see a copy of my visit note below.    S: Delbert Craig is here for in person evaluation of his left wrist.  He was seen in video visit on Friday of last week and it was determined that an in person visit would be more appropriate.  He has pain located over the dorsal aspect of his wrist, and significant pain with extension.  He does report that some of it feels like it may be improving since the last time we spoke.  He feels as though wearing his wrist brace is more comfortable when he sleeps.  He has been trying to take it easy over the weekend to help with his pain.  No new numbness or tingling in his hand.  No other new questions today.    O: There were no vitals taken for this visit.  GEN: KAYLA, pleasant and cooperative  HEENT: EOMI, daniela patent, MMM  PSYCH: Mood and affect appropriate  SKIN: No rashes or lesions.    MSK:   LEFT WRIST: There is mild swelling over the dorsal aspect of his wrist centrally.  No bruising appreciated.  TTP over the triquetrum.  Near normal wrist flexion, limited wrist extension due to pain.  Discomfort with radial and ulnar deviation.  No significant pain with resisted finger extension.  Sensation light touch intact.  Radial pulse 2+.    A/P: Delbert Craig is here for evaluation of left wrist pain that has been ongoing since the end of February.  Had used a OTC wrist brace with some improvement in his pain.  Given discussion over video visit last week it was deemed appropriate for an in person visit with x-rays.  X-ray of left wrist were obtained today and demonstrate a triquetral fracture which is seen on the lateral view.  No evidence of scapholunate ligament disruption.  These findings were discussed  with the patient.    -Cock-up wrist splint to wear at most times for the next 2 weeks  -Discussed gradual progression of activities when he has no pain with wrist extension or weightbearing on his wrist  -If he is still having troubles in the next 3-4 weeks he will send a MyChart message to discuss the need for a follow-up visit    Seen and discussed with Dr. Poe.    Andreina Vides DO  Primary Care Sports Medicine Fellow      Attending Note:   I have personally examined this patient and have reviewed the clinical presentation and progress note with the fellow. I agree with the treatment plan as outlined. The plan was formulated with the fellow on the day of the patient's visit. I have reviewed all imaging with the fellow and agree with the findings in the documentation.     Sonam Poe MD, CAQ, CCD  [facility]  Sports Medicine and Bone Health    Again, thank you for allowing me to participate in the care of your patient.      Sincerely,    Sonam Poe MD

## 2020-04-27 NOTE — PROGRESS NOTES
S: Delbert Craig is here for in person evaluation of his left wrist.  He was seen in video visit on Friday of last week and it was determined that an in person visit would be more appropriate.  He has pain located over the dorsal aspect of his wrist, and significant pain with extension.  He does report that some of it feels like it may be improving since the last time we spoke.  He feels as though wearing his wrist brace is more comfortable when he sleeps.  He has been trying to take it easy over the weekend to help with his pain.  No new numbness or tingling in his hand.  No other new questions today.    O: There were no vitals taken for this visit.  GEN: KAYLA, pleasant and cooperative  HEENT: EOMI, nares patent, MMM  PSYCH: Mood and affect appropriate  SKIN: No rashes or lesions.    MSK:   LEFT WRIST: There is mild swelling over the dorsal aspect of his wrist centrally.  No bruising appreciated.  TTP over the triquetrum.  Near normal wrist flexion, limited wrist extension due to pain.  Discomfort with radial and ulnar deviation.  No significant pain with resisted finger extension.  Sensation light touch intact.  Radial pulse 2+.    A/P: Delbert Craig is here for evaluation of left wrist pain that has been ongoing since the end of February.  Had used a OTC wrist brace with some improvement in his pain.  Given discussion over video visit last week it was deemed appropriate for an in person visit with x-rays.  X-ray of left wrist were obtained today and demonstrate a triquetral fracture which is seen on the lateral view.  No evidence of scapholunate ligament disruption.  These findings were discussed with the patient.    -Cock-up wrist splint to wear at most times for the next 2 weeks  -Discussed gradual progression of activities when he has no pain with wrist extension or weightbearing on his wrist  -If he is still having troubles in the next 3-4 weeks he will send a MyChart message to discuss the need for a  follow-up visit    Seen and discussed with Dr. Poe.    Andreina Vides DO  Primary Care Sports Medicine Fellow

## 2020-09-30 ENCOUNTER — ANCILLARY PROCEDURE (OUTPATIENT)
Dept: GENERAL RADIOLOGY | Facility: CLINIC | Age: 40
End: 2020-09-30
Attending: FAMILY MEDICINE
Payer: COMMERCIAL

## 2020-09-30 ENCOUNTER — OFFICE VISIT (OUTPATIENT)
Dept: ORTHOPEDICS | Facility: CLINIC | Age: 40
End: 2020-09-30
Payer: COMMERCIAL

## 2020-09-30 DIAGNOSIS — S62.112A CLOSED DISPLACED FRACTURE OF TRIQUETRUM OF LEFT WRIST, INITIAL ENCOUNTER: Primary | ICD-10-CM

## 2020-09-30 DIAGNOSIS — M25.532 LEFT WRIST PAIN: ICD-10-CM

## 2020-09-30 NOTE — PROGRESS NOTES
Attending Note:   I have personally examined this patient and have reviewed the clinical presentation and progress note with the fellow. I agree with the treatment plan as outlined. The plan was formulated with the fellow on the day of the patient's visit. I have reviewed all imaging with the fellow and agree with the findings in the documentation.     Sonam Poe MD, CAQ, CCD  [facility]  Sports Medicine and Bone Health

## 2020-09-30 NOTE — LETTER
9/30/2020      RE: Delbert Craig  [address] [id number]       S: Delbert Craig is here for follow-up of his left wrist triquetral fracture.  Initial injury happened 5 months ago.  Patient or bracing as instructed for 1.5 months.  Self weaned from using it.  No swelling.  No pain.  Some weakness in the left forearm muscles.  Attempted fist push-ups 3 months ago and landed on the dorsum of his wrist with immediate pain.  Was able to move the wrist but did not seek follow-up care.  Patient has been able to work out with mild limitations in range of motion.  Has not been using anti-inflammatories or Tylenol frequently.  No loss of sensation.  No fevers or chills.    O: There were no vitals taken for this visit.  GEN: KAYLA, pleasant and cooperative  HEENT: EOMI, gaeles patent, MMM  PSYCH: Mood and affect appropriate  SKIN: No rashes or lesions.    MSK:   LEFT WRIST: Decreased range of motion in wrist extension.  Normal range of motion in flexion adduction and abduction.  No bony tenderness noted.  No scapholunate tenderness.  Strength decreased in wrist extension, flexion, abduction, and adduction to 4/5 when compared to the right 5/5.  Neurovascularly intact.    X-rays left wrist: Resolution of partially displaced triquetral fracture noticed in the lateral views.  Slight bony overgrowth noted in the area.  No soft tissue edema noted.  No other fractures or osseous abnormalities noted.    A/P: Triquetral fracture, follow-up    Patient has not been in a cock-up splint for the past 3 months.  Range of motion slightly limited secondary to healing and lack of therapy.  At this time recommending physical therapy for patient to address range of motion.  Pain minimal.  OTC NSAIDs or Tylenol as needed.  Can return for follow-up in 4 to 6 weeks to assess status with physical therapy.      Seen and discussed with Dr. Poe.    Xavi Morales DO  Primary Care Sports Medicine Fellow        Attending  Note:   I have personally examined this patient and have reviewed the clinical presentation and progress note with the fellow. I agree with the treatment plan as outlined. The plan was formulated with the fellow on the day of the patient's visit. I have reviewed all imaging with the fellow and agree with the findings in the documentation.     Sonam Poe MD, CAQ, CCD  [facility]  Sports Medicine and Bone Health    Sonam Poe MD

## 2020-09-30 NOTE — PROGRESS NOTES
S: Delbert Craig is here for follow-up of his left wrist triquetral fracture.  Initial injury happened 5 months ago.  Patient or bracing as instructed for 1.5 months.  Self weaned from using it.  No swelling.  No pain.  Some weakness in the left forearm muscles.  Attempted fist push-ups 3 months ago and landed on the dorsum of his wrist with immediate pain.  Was able to move the wrist but did not seek follow-up care.  Patient has been able to work out with mild limitations in range of motion.  Has not been using anti-inflammatories or Tylenol frequently.  No loss of sensation.  No fevers or chills.    O: There were no vitals taken for this visit.  GEN: KAYLA, pleasant and cooperative  HEENT: EOMI, daniela patent, MMM  PSYCH: Mood and affect appropriate  SKIN: No rashes or lesions.    MSK:   LEFT WRIST: Decreased range of motion in wrist extension.  Normal range of motion in flexion adduction and abduction.  No bony tenderness noted.  No scapholunate tenderness.  Strength decreased in wrist extension, flexion, abduction, and adduction to 4/5 when compared to the right 5/5.  Neurovascularly intact.    X-rays left wrist: Resolution of partially displaced triquetral fracture noticed in the lateral views.  Slight bony overgrowth noted in the area.  No soft tissue edema noted.  No other fractures or osseous abnormalities noted.    A/P: Triquetral fracture, follow-up    Patient has not been in a cock-up splint for the past 3 months.  Range of motion slightly limited secondary to healing and lack of therapy.  At this time recommending physical therapy for patient to address range of motion.  Pain minimal.  OTC NSAIDs or Tylenol as needed.  Can return for follow-up in 4 to 6 weeks to assess status with physical therapy.      Seen and discussed with Dr. Poe.    Xavi Morales DO  Primary Care Sports Medicine Fellow

## 2020-10-20 NOTE — PROGRESS NOTES
Hand Therapy Initial Evaluation    Current Date:  10/21/2020    Diagnosis: Left distal radius fracture  DOI: February 2020, but not diagnosed until May 2020    Precautions: na    Subjective:  Delbert Craig is a 39 year old male.    Patient reports symptoms of the left wrist which occurred due to fracture. Since onset symptoms are Gradually getting better.  General health as reported by patient is excellent.  Pertinent medical history includes:none  Medical allergies:none.  Surgical history: none.  Medication history: None.    Current occupation is Government Investigator  Job Tasks: Computer Work    Occupational Profile Information:  Right hand dominant  Prior functional level:  no limitations  Patient reports symptoms of pain and stiffness/loss of motion  Special tests:  x-ray.    Previous treatment: splint  Barriers include:none  Mobility: No difficulty  Transportation: drives  Currently working in normal job without restrictions  Leisure activities/hobbies: basketball, cross fit, yoga  Other: yard work, cooking (cast iron), remodeling (hammering and drill), lifting kids    Functional Outcome Measure:   Upper Extremity Functional Index Score:  SCORE:   Column Totals: /80: 74   (A lower score indicates greater disability.)    Objective:  Pain Level (Scale 0-10)   10/21/2020   At Rest 2   With Use 2     Pain Description  Date 10/21/2020   Location wrist   Pain Quality Shooting   Frequency intermittent     Pain is worst  daytime   Exacerbated by  weight bearing   Relieved by none   Progression improving     Sensation    WNL throughout all nerve distributions; per patient report    Edema (Circumference measured in cm)   10/21/2020 10/21/2020    Right Left   DWC 18.0 cm 18.0 cm      ROM  Pain Report:  -none + mild    ++ moderate    +++ severe     Wrist 10/21/2020 10/21/2020   AROM (PROM) Right Left   Extension 71 71   Flexion 90 85   RD 15 15   UD 40 40   Supination WNL WNL   Pronation WNL WNL       Palpation  Pain  Report:  -none + mild    ++ moderate    +++ severe    10/21/2020   Radial Styloid -   DRSN -   TFCC -     Strength   (Measured in pounds)  Pain Report: - none  + mild    ++ moderate    +++ severe    10/21/20 10/21/2020   Trials Right Left   1  2  3     Average 123# 113#     Lat Pinch 10/21/2020 10/21/2020   Trials Right Left   1  2  3     Average 21# 20#     3 Pt Pinch 10/21/2020 10/21/2020   Trials Right Left   1  2  3     Average 22# 22#      Assessment:  Patient presents with symptoms consistent with diagnosis of left wrist distal radius fracture, with conservative intervention.     Patient's limitations or Problem List includes:  Pain of the left wrist which interferes with the patient's ability to perform Work Tasks and Household Chores as compared to previous level of function.    Rehab Potential:  Excellent - Return to full activity, no limitations    Patient will benefit from skilled Occupational Therapy to decrease pain to return to previous activity level and resume normal daily tasks and to reach their rehab potential.    Barriers to Learning:  No barrier    Communication Issues:  Patient appears to be able to clearly communicate and understand verbal and written communication and follow directions correctly.    Chart Review: Simple history review with patient    Identified Performance Deficits: health management and maintenance, meal preparation and cleanup and leisure activities    Assessment of Occupational Performance:  3-5 Performance Deficits    Clinical Decision Making (Complexity): Low complexity    Treatment Explanation:  The following has been discussed with the patient:  RX ordered/plan of care  Anticipated outcomes  Possible risks and side effects    Plan:  Frequency:  1 X week, prn  Duration:  for 1-4 visits    Treatment Plan:    Modalities:    Laser Light   Therapeutic Exercise:    AROM  Therapeutic Activities:   Functional activities   Manual Techniques:   Friction massage  Self Care:     Work Tasks    Discharge Plan:    Achieve all LTG.  Independent in home treatment program.  Reach maximal therapeutic benefit.    Home Program:   FM ulnar wrist with weight bearing on wrist  Self wrist distraction    Next Visit:  Laser  FM  Wrist mobs/distraction

## 2020-10-21 ENCOUNTER — THERAPY VISIT (OUTPATIENT)
Dept: OCCUPATIONAL THERAPY | Facility: CLINIC | Age: 40
End: 2020-10-21
Attending: STUDENT IN AN ORGANIZED HEALTH CARE EDUCATION/TRAINING PROGRAM
Payer: COMMERCIAL

## 2020-10-21 DIAGNOSIS — S62.112A CLOSED DISPLACED FRACTURE OF TRIQUETRUM OF LEFT WRIST, INITIAL ENCOUNTER: ICD-10-CM

## 2020-10-21 PROCEDURE — 97110 THERAPEUTIC EXERCISES: CPT | Mod: GO | Performed by: OCCUPATIONAL THERAPIST

## 2020-10-21 PROCEDURE — 97165 OT EVAL LOW COMPLEX 30 MIN: CPT | Mod: GO | Performed by: OCCUPATIONAL THERAPIST

## 2020-10-21 PROCEDURE — 97140 MANUAL THERAPY 1/> REGIONS: CPT | Mod: GO | Performed by: OCCUPATIONAL THERAPIST

## 2020-10-21 PROCEDURE — 97026 INFRARED THERAPY: CPT | Mod: GO | Performed by: OCCUPATIONAL THERAPIST

## 2020-11-22 ENCOUNTER — HEALTH MAINTENANCE LETTER (OUTPATIENT)
Age: 40
End: 2020-11-22

## 2021-01-06 NOTE — PROGRESS NOTES
Discharge Note -Hand Therapy    Initial Evaluation Date:  10/21/2020  Current Date: 1/6/2021  Number of Visits: 1    S:    Pt did not follow up for scheduled visits    O:  Objective information is not available as pt has not returned for therapy.  Last visit or last objective information will serve as final entry.      A: Pt did not return for further treatment.    Status of goals is unknown due to lack of followup of patient.      P:  Discharge from Hand Therapy.

## 2021-09-19 ENCOUNTER — HEALTH MAINTENANCE LETTER (OUTPATIENT)
Age: 41
End: 2021-09-19

## 2022-01-09 ENCOUNTER — HEALTH MAINTENANCE LETTER (OUTPATIENT)
Age: 42
End: 2022-01-09

## 2022-11-21 ENCOUNTER — HEALTH MAINTENANCE LETTER (OUTPATIENT)
Age: 42
End: 2022-11-21

## 2023-06-02 ENCOUNTER — HEALTH MAINTENANCE LETTER (OUTPATIENT)
Age: 43
End: 2023-06-02

## 2025-01-17 ENCOUNTER — LAB REQUISITION (OUTPATIENT)
Dept: LAB | Facility: CLINIC | Age: 45
End: 2025-01-17

## 2025-01-17 PROCEDURE — 88341 IMHCHEM/IMCYTCHM EA ADD ANTB: CPT | Mod: TC | Performed by: TRANSPLANT SURGERY
